# Patient Record
Sex: MALE | Race: WHITE | NOT HISPANIC OR LATINO | Employment: STUDENT | URBAN - METROPOLITAN AREA
[De-identification: names, ages, dates, MRNs, and addresses within clinical notes are randomized per-mention and may not be internally consistent; named-entity substitution may affect disease eponyms.]

---
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
## 2024-10-07 ENCOUNTER — APPOINTMENT (EMERGENCY)
Dept: RADIOLOGY | Facility: HOSPITAL | Age: 19
End: 2024-10-07
Payer: COMMERCIAL

## 2024-10-07 ENCOUNTER — HOSPITAL ENCOUNTER (EMERGENCY)
Facility: HOSPITAL | Age: 19
Discharge: HOME | End: 2024-10-07
Payer: COMMERCIAL

## 2024-10-07 VITALS
RESPIRATION RATE: 16 BRPM | WEIGHT: 133.6 LBS | OXYGEN SATURATION: 99 % | TEMPERATURE: 98.5 F | HEART RATE: 93 BPM | DIASTOLIC BLOOD PRESSURE: 65 MMHG | BODY MASS INDEX: 19.73 KG/M2 | SYSTOLIC BLOOD PRESSURE: 108 MMHG

## 2024-10-07 DIAGNOSIS — R10.9 ABDOMINAL PAIN: Primary | ICD-10-CM

## 2024-10-07 LAB
ALBUMIN SERPL BCG-MCNC: 4.7 G/DL (ref 3.5–5)
ALP SERPL-CCNC: 58 U/L (ref 34–104)
ALT SERPL W P-5'-P-CCNC: 11 U/L (ref 7–52)
ANION GAP SERPL CALCULATED.3IONS-SCNC: 11 MMOL/L (ref 4–13)
AST SERPL W P-5'-P-CCNC: 18 U/L (ref 13–39)
BASOPHILS # BLD AUTO: 0.03 THOUSANDS/ΜL (ref 0–0.1)
BASOPHILS NFR BLD AUTO: 1 % (ref 0–1)
BILIRUB SERPL-MCNC: 0.42 MG/DL (ref 0.2–1)
BUN SERPL-MCNC: 15 MG/DL (ref 5–25)
CALCIUM SERPL-MCNC: 9.2 MG/DL (ref 8.4–10.2)
CHLORIDE SERPL-SCNC: 104 MMOL/L (ref 96–108)
CO2 SERPL-SCNC: 24 MMOL/L (ref 21–32)
CREAT SERPL-MCNC: 0.86 MG/DL (ref 0.6–1.3)
EOSINOPHIL # BLD AUTO: 0.13 THOUSAND/ΜL (ref 0–0.61)
EOSINOPHIL NFR BLD AUTO: 2 % (ref 0–6)
ERYTHROCYTE [DISTWIDTH] IN BLOOD BY AUTOMATED COUNT: 12.2 % (ref 11.6–15.1)
GFR SERPL CREATININE-BSD FRML MDRD: 123 ML/MIN/1.73SQ M
GLUCOSE SERPL-MCNC: 111 MG/DL (ref 65–140)
HCT VFR BLD AUTO: 47.3 % (ref 36.5–49.3)
HGB BLD-MCNC: 16 G/DL (ref 12–17)
IMM GRANULOCYTES # BLD AUTO: 0.01 THOUSAND/UL (ref 0–0.2)
IMM GRANULOCYTES NFR BLD AUTO: 0 % (ref 0–2)
LIPASE SERPL-CCNC: 22 U/L (ref 11–82)
LYMPHOCYTES # BLD AUTO: 2.04 THOUSANDS/ΜL (ref 0.6–4.47)
LYMPHOCYTES NFR BLD AUTO: 35 % (ref 14–44)
MCH RBC QN AUTO: 30 PG (ref 26.8–34.3)
MCHC RBC AUTO-ENTMCNC: 33.8 G/DL (ref 31.4–37.4)
MCV RBC AUTO: 89 FL (ref 82–98)
MONOCYTES # BLD AUTO: 0.4 THOUSAND/ΜL (ref 0.17–1.22)
MONOCYTES NFR BLD AUTO: 7 % (ref 4–12)
NEUTROPHILS # BLD AUTO: 3.28 THOUSANDS/ΜL (ref 1.85–7.62)
NEUTS SEG NFR BLD AUTO: 55 % (ref 43–75)
NRBC BLD AUTO-RTO: 0 /100 WBCS
PLATELET # BLD AUTO: 171 THOUSANDS/UL (ref 149–390)
PMV BLD AUTO: 10.5 FL (ref 8.9–12.7)
POTASSIUM SERPL-SCNC: 3.6 MMOL/L (ref 3.5–5.3)
PROT SERPL-MCNC: 6.8 G/DL (ref 6.4–8.4)
RBC # BLD AUTO: 5.33 MILLION/UL (ref 3.88–5.62)
SODIUM SERPL-SCNC: 139 MMOL/L (ref 135–147)
WBC # BLD AUTO: 5.89 THOUSAND/UL (ref 4.31–10.16)

## 2024-10-07 PROCEDURE — 74177 CT ABD & PELVIS W/CONTRAST: CPT

## 2024-10-07 PROCEDURE — 36415 COLL VENOUS BLD VENIPUNCTURE: CPT

## 2024-10-07 PROCEDURE — 96366 THER/PROPH/DIAG IV INF ADDON: CPT

## 2024-10-07 PROCEDURE — 99284 EMERGENCY DEPT VISIT MOD MDM: CPT

## 2024-10-07 PROCEDURE — 99285 EMERGENCY DEPT VISIT HI MDM: CPT

## 2024-10-07 PROCEDURE — 96375 TX/PRO/DX INJ NEW DRUG ADDON: CPT

## 2024-10-07 PROCEDURE — 96365 THER/PROPH/DIAG IV INF INIT: CPT

## 2024-10-07 PROCEDURE — 83690 ASSAY OF LIPASE: CPT

## 2024-10-07 PROCEDURE — 85025 COMPLETE CBC W/AUTO DIFF WBC: CPT

## 2024-10-07 PROCEDURE — 80053 COMPREHEN METABOLIC PANEL: CPT

## 2024-10-07 RX ORDER — DROPERIDOL 2.5 MG/ML
0.62 INJECTION, SOLUTION INTRAMUSCULAR; INTRAVENOUS ONCE
Status: COMPLETED | OUTPATIENT
Start: 2024-10-07 | End: 2024-10-07

## 2024-10-07 RX ORDER — SODIUM CHLORIDE, SODIUM GLUCONATE, SODIUM ACETATE, POTASSIUM CHLORIDE, MAGNESIUM CHLORIDE, SODIUM PHOSPHATE, DIBASIC, AND POTASSIUM PHOSPHATE .53; .5; .37; .037; .03; .012; .00082 G/100ML; G/100ML; G/100ML; G/100ML; G/100ML; G/100ML; G/100ML
1000 INJECTION, SOLUTION INTRAVENOUS ONCE
Status: COMPLETED | OUTPATIENT
Start: 2024-10-07 | End: 2024-10-07

## 2024-10-07 RX ADMIN — IOHEXOL 85 ML: 350 INJECTION, SOLUTION INTRAVENOUS at 06:04

## 2024-10-07 RX ADMIN — DROPERIDOL 0.62 MG: 2.5 INJECTION, SOLUTION INTRAMUSCULAR; INTRAVENOUS at 05:55

## 2024-10-07 RX ADMIN — SODIUM CHLORIDE, SODIUM GLUCONATE, SODIUM ACETATE, POTASSIUM CHLORIDE, MAGNESIUM CHLORIDE, SODIUM PHOSPHATE, DIBASIC, AND POTASSIUM PHOSPHATE 1000 ML: .53; .5; .37; .037; .03; .012; .00082 INJECTION, SOLUTION INTRAVENOUS at 05:14

## 2024-10-07 NOTE — ED ATTENDING ATTESTATION
I, Herb Cruz DO, saw and evaluated the patient. All available labs and X-rays were reviewed. I discussed the patient with the resident / non-physician and agree with the resident's / non-physician practitioner's findings and plan as documented in the resident's / non-physician practicitioner's note, except where noted. At this point, I agree with the current assessment done in the ED.     NAME: Abiel Venegas  AGE: 19 y.o. SEX: male  : 2005   MRN: [id number]  ENCOUNTER: [id number]    Disposition   Active Problems:  There are no active Hospital Problems.      ED Disposition       ED Disposition   Discharge    Condition   Stable    Date/Time   Mon Oct 7, 2024  6:52 AM    Comment   Abiel Venegas discharge to home/self care.                      Assessment/Plan   Medical Decision Making  Patient with history as below presented with abdominal pain. History obtained from patient.    Differential diagnosis includes: Appendicitis, cholecystitis, obstruction    Plan: CBC, CMP, lipase, CT abdomen pelvis, droperidol, fluids    Labs reviewed and unremarkable. Independently reviewed imaging without acute emergent pathology. Patient was treated with below with improvement in symptoms. Reassessed the patient and they continue to be well appearing. Presentation undifferentiated however does not appear emergent nature. Stable for outpatient management.    Disposition: Discharged with instructions to obtain outpatient follow up of patient's symptoms and findings, with strict return precautions if patient develops new or worsening symptoms. Patient understands this plan and is agreeable. All questions answered. Patient discharged home with return precautions.    Amount and/or Complexity of Data Reviewed  Labs: ordered.  Radiology: ordered.    Risk  Prescription drug management.                        History of Present Illness     Patient is a 19 y.o. male with a significant past medical history of anxiety,  bipolar disorder, presenting for evaluation of abdominal pain.  Patient reports that he has chronic abdominal pain, however he is unsure of his diagnosis as [place].  He reports that today he had some abdominal pain that is similar to previous.  It is a sharp, lower abdominal pain.  There is no radiation.  He associates some nonbloody, nonmelanotic diarrhea.  There is no associated urinary symptoms, fevers, nausea, vomiting.  He is otherwise without complaint.    Past Medical History     Past Medical History:   Diagnosis Date    Anxiety     Asperger syndrome     Bipolar 1 disorder (HCC)     Depression        Past Surgical History     Past Surgical History:   Procedure Laterality Date    TONSILLECTOMY         Social History     Social History     Substance and Sexual Activity   Alcohol Use No     Social History     Substance and Sexual Activity   Drug Use Yes    Types: Marijuana     Social History     Tobacco Use   Smoking Status Every Day    Current packs/day: 1.50    Types: Cigarettes   Smokeless Tobacco Current   Tobacco Comments    Tammy       Family History     Family History   Problem Relation Age of Onset    No Known Problems Mother     No Known Problems Father     Thyroid disease unspecified Neg Hx        Medications Prior to Admission     Prior to Admission medications    Medication Sig Start Date End Date Taking? Authorizing Provider   cloNIDine (CATAPRES) 0.1 mg tablet Take 0.1 mg by mouth every 12 (twelve) hours 1 tab in the morning and 1/2 tab at night.    Historical Provider, MD   Desvenlafaxine Succinate ER (PRISTIQ) 25 MG TB24 Take 25 mg by mouth every morning    Historical Provider, MD   lithium carbonate (LITHOBID) 450 mg CR tablet Take 450 mg by mouth 2 (two) times a day 450mg in the morning and 600mg at night.     Historical Provider, MD   LORazepam (ATIVAN) 1 mg tablet Take 1 mg by mouth as needed      Historical Provider, MD   QUEtiapine (SEROquel XR) 200  mg 24 hr tablet Take 200 mg by mouth daily    Historical Provider, MD   QUEtiapine (SEROquel) 100 mg tablet Take 100 mg by mouth daily at bedtime      Historical Provider, MD       Allergies     Allergies   Allergen Reactions    Sulfa Antibiotics Hives    Penicillins        Objective     Vitals:    10/07/24 0444 10/07/24 0451 10/07/24 0545 10/07/24 0600   BP: (!) 152/112  110/58 108/65   Pulse: 89  65 93   Resp: 18  16 16   Temp:  98.5 °F (36.9 °C)     SpO2: 99%  98% 99%   Weight: 60.6 kg (133 lb 9.6 oz)        Body mass index is 19.73 kg/m².  No intake or output data in the 24 hours ending 10/07/24 0705  Invasive Devices       Peripheral Intravenous Line  Duration             Peripheral IV 10/07/24 Right Antecubital <1 day                    Review of Systems   Constitutional:  Negative for fever.   Respiratory:  Negative for shortness of breath.    Cardiovascular:  Negative for chest pain.   Gastrointestinal:  Positive for abdominal pain and diarrhea. Negative for blood in stool, nausea and vomiting.   Genitourinary:  Negative for dysuria.        Physical Exam  Vitals and nursing note reviewed.   Constitutional:       General: He is not in acute distress.     Appearance: Normal appearance. He is not ill-appearing or toxic-appearing.   HENT:      Head: Normocephalic and atraumatic.      Right Ear: External ear normal.      Left Ear: External ear normal.      Nose: Nose normal.   Eyes:      General: No scleral icterus.        Right eye: No discharge.         Left eye: No discharge.      Extraocular Movements: Extraocular movements intact.      Conjunctiva/sclera: Conjunctivae normal.   Cardiovascular:      Rate and Rhythm: Normal rate.      Heart sounds: Normal heart sounds. No murmur heard.     No friction rub. No gallop.   Pulmonary:      Effort: Pulmonary effort is normal. No respiratory distress.      Breath sounds: Normal breath sounds.   Abdominal:      General: Abdomen is flat. There is no distension.       Palpations: Abdomen is soft. There is no mass.      Tenderness: There is abdominal tenderness in the suprapubic area.   Genitourinary:     Comments: Deferred  Skin:     General: Skin is warm and dry.   Neurological:      General: No focal deficit present.      Mental Status: He is alert.          Medications   multi-electrolyte (ISOLYTE-S PH 7.4) bolus 1,000 mL (1,000 mL Intravenous New Bag 10/7/24 0514)   droperidol (INAPSINE) injection 0.625 mg (0.625 mg Intravenous Given 10/7/24 0555)   iohexol (OMNIPAQUE) 350 MG/ML injection (MULTI-DOSE) 85 mL (85 mL Intravenous Given 10/7/24 0604)        Results Reviewed       Procedure Component Value Units Date/Time    Comprehensive metabolic panel [[id number]] Collected: 10/07/24 0513    Lab Status: Final result Specimen: Blood from Arm, Right Updated: 10/07/24 0544     Sodium 139 mmol/L      Potassium 3.6 mmol/L      Chloride 104 mmol/L      CO2 24 mmol/L      ANION GAP 11 mmol/L      BUN 15 mg/dL      Creatinine 0.86 mg/dL      Glucose 111 mg/dL      Calcium 9.2 mg/dL      AST 18 U/L      ALT 11 U/L      Alkaline Phosphatase 58 U/L      Total Protein 6.8 g/dL      Albumin 4.7 g/dL      Total Bilirubin 0.42 mg/dL      eGFR 123 ml/min/1.73sq m     Narrative:      [employer] guidelines for Chronic Kidney Disease (CKD):     Stage 1 with normal or high GFR (GFR > 90 mL/min/1.73 square meters)    Stage 2 Mild CKD (GFR = 60-89 mL/min/1.73 square meters)    Stage 3A Moderate CKD (GFR = 45-59 mL/min/1.73 square meters)    Stage 3B Moderate CKD (GFR = 30-44 mL/min/1.73 square meters)    Stage 4 Severe CKD (GFR = 15-29 mL/min/1.73 square meters)    Stage 5 End Stage CKD (GFR <15 mL/min/1.73 square meters)  Note: GFR calculation is accurate only with a steady state creatinine    Lipase [[id number]]  (Normal) Collected: 10/07/24 0513    Lab Status: Final result Specimen: Blood from Arm, Right Updated: 10/07/24 0544     Lipase 22 u/L     CBC and differential  [[id number]] Collected: 10/07/24 0513    Lab Status: Final result Specimen: Blood from Arm, Right Updated: 10/07/24 0529     WBC 5.89 Thousand/uL      RBC 5.33 Million/uL      Hemoglobin 16.0 g/dL      Hematocrit 47.3 %      MCV 89 fL      MCH 30.0 pg      MCHC 33.8 g/dL      RDW 12.2 %      MPV 10.5 fL      Platelets 171 Thousands/uL      nRBC 0 /100 WBCs      Segmented % 55 %      Immature Grans % 0 %      Lymphocytes % 35 %      Monocytes % 7 %      Eosinophils Relative 2 %      Basophils Relative 1 %      Absolute Neutrophils 3.28 Thousands/µL      Absolute Immature Grans 0.01 Thousand/uL      Absolute Lymphocytes 2.04 Thousands/µL      Absolute Monocytes 0.40 Thousand/µL      Eosinophils Absolute 0.13 Thousand/µL      Basophils Absolute 0.03 Thousands/µL              CT abdomen pelvis with contrast   Final Result by Amanda Alfaro MD (10/07 0631)      No acute findings. No findings identified to explain pain.            Workstation performed: [id number]              ED Course         Procedures   Procedures

## 2024-10-07 NOTE — ED PROVIDER NOTES
Final diagnoses:   Abdominal pain     ED Disposition       ED Disposition   Discharge    Condition   Stable    Date/Time   Mon Oct 7, 2024  6:52 AM    Comment   Abiel Venegas discharge to home/self care.                   Assessment & Plan       Medical Decision Making  Amount and/or Complexity of Data Reviewed  Labs: ordered. Decision-making details documented in ED Course.  Radiology: ordered. Decision-making details documented in ED Course.    Risk  Prescription drug management.        ED Course as of 10/07/24 0826   Mon Oct 07, 2024   0500 Patient seen and evaluated by me  DDx: Flareup of patient's underlying abdominal pain for which the underlying source is unknown.  Given lower abdominal pain, concern for acute appendicitis.  Side effect secondary to marijuana use, electrolyte abnormality, anemia, pancreatitis, biliary disease.  Workup and plan: CBC, CMP, lipase, CT abdomen and pelvis.  Fluids, droperidol.   0620 CBC and differential  WNL   0620 Comprehensive metabolic panel  WNL   0620 LIPASE: 22   0648 CT abdomen pelvis with contrast  IMPRESSION:     No acute findings. No findings identified to explain pain.     0652 Patient reports resolution of symptoms.  He is discharged at this time, abdomen benign.  Patient given return precautions and follow-up information.  He reports understanding, all questions answered.       Medications   multi-electrolyte (ISOLYTE-S PH 7.4) bolus 1,000 mL (0 mL Intravenous Stopped 10/7/24 0655)   droperidol (INAPSINE) injection 0.625 mg (0.625 mg Intravenous Given 10/7/24 0555)   iohexol (OMNIPAQUE) 350 MG/ML injection (MULTI-DOSE) 85 mL (85 mL Intravenous Given 10/7/24 0604)       ED Risk Strat Scores                           SBIRT 20yo+      Flowsheet Row Most Recent Value   Initial Alcohol Screen: US AUDIT-C     1. How often do you have a drink containing alcohol? 0 Filed at: 10/07/2024 0446   2. How many drinks containing alcohol do you have on a typical day you are  drinking?  0 Filed at: 10/07/2024 0446   3a. Male UNDER 65: How often do you have five or more drinks on one occasion? 0 Filed at: 10/07/2024 0446   3b. FEMALE Any Age, or MALE 65+: How often do you have 4 or more drinks on one occassion? 0 Filed at: 10/07/2024 0446   Audit-C Score 0 Filed at: 10/07/2024 0446   BAILEE: How many times in the past year have you...    Used an illegal drug or used a prescription medication for non-medical reasons? Never Filed at: 10/07/2024 0446                            History of Present Illness       Chief Complaint   Patient presents with    Abdominal Pain     Pt walking back to apartment at [place] and c/o abdominal pain. Pt states it is a chronic problem. Denies NVD.        Past Medical History:   Diagnosis Date    Anxiety     Asperger syndrome     Bipolar 1 disorder (HCC)     Depression       Past Surgical History:   Procedure Laterality Date    TONSILLECTOMY        Family History   Problem Relation Age of Onset    No Known Problems Mother     No Known Problems Father     Thyroid disease unspecified Neg Hx       Social History     Tobacco Use    Smoking status: Every Day     Current packs/day: 1.50     Types: Cigarettes    Smokeless tobacco: Current    Tobacco comments:     Tammy   Substance Use Topics    Alcohol use: No    Drug use: Yes     Types: Marijuana      E-Cigarette/Vaping      E-Cigarette/Vaping Substances      I have reviewed and agree with the history as documented.     Patient is a 19-year-old male, no significant past medical history presenting for the evaluation of abdominal pain.  Patient states that he does have abdominal pain frequently which flares up from time to time.  He is unsure of prior diagnoses made for the abdominal pain.  Patient states tonight's episode feels similar to prior episodes.  He [facility] he had sudden onset sharp periumbilical pain.  He states that it was very sharp and severe for 2 to 3 minutes  before gradually subsiding.  At this point, patient still does have abdominal pain, but describes it more as an achy pain rather than severe and sharp as it was earlier.  Patient does report associated nausea but no vomiting.  He states that when the pain came on sharply today, he felt lightheaded as if he was going to pass out.  He did not lose consciousness at any time.  He reports associated diarrhea over the past few days.  He states that normally when his pain does flareup he does get diarrhea as well.  He reports blood in his diarrhea intermittently, describes it as a liquid stool.  Patient denies any fevers or sick contacts.  He denies any recent travel outside of the country or antibiotic use.  He denies any specific suspicious food intake.  Patient has not been around anybody else with similar symptoms.  No urinary symptoms.  No abdominal surgeries.  Patient does use marijuana daily as well as vapes daily.  He reports occasional alcohol use, but did not drink at all tonight.          Review of Systems   Constitutional:  Negative for chills, fatigue and fever.   HENT:  Negative for congestion.    Respiratory:  Negative for cough, chest tightness and shortness of breath.    Cardiovascular:  Negative for chest pain.   Gastrointestinal:  Positive for abdominal pain, diarrhea and nausea. Negative for vomiting.   Genitourinary:  Negative for difficulty urinating, dysuria, frequency and hematuria.   Skin:  Negative for color change.   Neurological:  Positive for light-headedness. Negative for dizziness, syncope, weakness, numbness and headaches.           Objective       ED Triage Vitals   Temperature Pulse Blood Pressure Respirations SpO2 Patient Position - Orthostatic VS   10/07/24 0451 10/07/24 0444 10/07/24 0444 10/07/24 0444 10/07/24 0444 --   98.5 °F (36.9 °C) 89 (!) 152/112 18 99 %       Temp src Heart Rate Source BP Location FiO2 (%) Pain Score    -- -- -- -- --              Vitals      Date and Time Temp  Pulse SpO2 Resp BP Pain Score FACES Pain Rating User   10/07/24 0600 -- 93 99 % 16 108/65 -- -- [facility]   10/07/24 0545 -- 65 98 % 16 110/58 -- -- [facility]   10/07/24 0451 98.5 °F (36.9 °C) -- -- -- -- -- -- [facility]   10/07/24 0444 -- 89 99 % 18 152/112 -- -- [facility]            Physical Exam  Vitals and nursing note reviewed.   Constitutional:       General: He is not in acute distress.     Appearance: Normal appearance. He is not ill-appearing or toxic-appearing.   HENT:      Head: Normocephalic and atraumatic.      Nose: No congestion.   Eyes:      General: No scleral icterus.     Extraocular Movements: Extraocular movements intact.   Cardiovascular:      Rate and Rhythm: Normal rate and regular rhythm.      Pulses: Normal pulses.      Heart sounds: Normal heart sounds. No murmur heard.  Pulmonary:      Effort: Pulmonary effort is normal. No respiratory distress.      Breath sounds: Normal breath sounds. No wheezing or rhonchi.   Abdominal:      General: Abdomen is flat. There is no distension.      Palpations: Abdomen is soft.      Tenderness: There is abdominal tenderness in the periumbilical area. There is guarding (Voluntary).   Musculoskeletal:         General: Normal range of motion.      Cervical back: Normal range of motion.   Skin:     General: Skin is warm and dry.      Capillary Refill: Capillary refill takes less than 2 seconds.   Neurological:      General: No focal deficit present.      Mental Status: He is alert.      Cranial Nerves: No cranial nerve deficit.      Sensory: No sensory deficit.      Motor: No weakness.      Gait: Gait normal.   Psychiatric:         Mood and Affect: Mood normal.         Behavior: Behavior normal.         Results Reviewed       Procedure Component Value Units Date/Time    Comprehensive metabolic panel [[id number]] Collected: 10/07/24 0513    Lab Status: Final result Specimen: Blood from Arm, Right Updated: 10/07/24 0544     Sodium 139 mmol/L      Potassium 3.6 mmol/L      Chloride 104  mmol/L      CO2 24 mmol/L      ANION GAP 11 mmol/L      BUN 15 mg/dL      Creatinine 0.86 mg/dL      Glucose 111 mg/dL      Calcium 9.2 mg/dL      AST 18 U/L      ALT 11 U/L      Alkaline Phosphatase 58 U/L      Total Protein 6.8 g/dL      Albumin 4.7 g/dL      Total Bilirubin 0.42 mg/dL      eGFR 123 ml/min/1.73sq m     Narrative:      National Kidney Disease Foundation guidelines for Chronic Kidney Disease (CKD):     Stage 1 with normal or high GFR (GFR > 90 mL/min/1.73 square meters)    Stage 2 Mild CKD (GFR = 60-89 mL/min/1.73 square meters)    Stage 3A Moderate CKD (GFR = 45-59 mL/min/1.73 square meters)    Stage 3B Moderate CKD (GFR = 30-44 mL/min/1.73 square meters)    Stage 4 Severe CKD (GFR = 15-29 mL/min/1.73 square meters)    Stage 5 End Stage CKD (GFR <15 mL/min/1.73 square meters)  Note: GFR calculation is accurate only with a steady state creatinine    Lipase [[id number]]  (Normal) Collected: 10/07/24 0513    Lab Status: Final result Specimen: Blood from Arm, Right Updated: 10/07/24 0544     Lipase 22 u/L     CBC and differential [[id number]] Collected: 10/07/24 0513    Lab Status: Final result Specimen: Blood from Arm, Right Updated: 10/07/24 0529     WBC 5.89 Thousand/uL      RBC 5.33 Million/uL      Hemoglobin 16.0 g/dL      Hematocrit 47.3 %      MCV 89 fL      MCH 30.0 pg      MCHC 33.8 g/dL      RDW 12.2 %      MPV 10.5 fL      Platelets 171 Thousands/uL      nRBC 0 /100 WBCs      Segmented % 55 %      Immature Grans % 0 %      Lymphocytes % 35 %      Monocytes % 7 %      Eosinophils Relative 2 %      Basophils Relative 1 %      Absolute Neutrophils 3.28 Thousands/µL      Absolute Immature Grans 0.01 Thousand/uL      Absolute Lymphocytes 2.04 Thousands/µL      Absolute Monocytes 0.40 Thousand/µL      Eosinophils Absolute 0.13 Thousand/µL      Basophils Absolute 0.03 Thousands/µL             CT abdomen pelvis with contrast   Final Interpretation by Amanda Alfaro MD (10/07 0631)      No acute  findings. No findings identified to explain pain.            Workstation performed: [id number]             Procedures    ED Medication and Procedure Management   Prior to Admission Medications   Prescriptions Last Dose Informant Patient Reported? Taking?   Desvenlafaxine Succinate ER (PRISTIQ) 25 MG TB24   Yes No   Sig: Take 25 mg by mouth every morning   LORazepam (ATIVAN) 1 mg tablet   Yes No   Sig: Take 1 mg by mouth as needed     QUEtiapine (SEROquel XR) 200 mg 24 hr tablet  Father Yes No   Sig: Take 200 mg by mouth daily   QUEtiapine (SEROquel) 100 mg tablet   Yes No   Sig: Take 100 mg by mouth daily at bedtime     cloNIDine (CATAPRES) 0.1 mg tablet   Yes No   Sig: Take 0.1 mg by mouth every 12 (twelve) hours 1 tab in the morning and 1/2 tab at night.   lithium carbonate (LITHOBID) 450 mg CR tablet   Yes No   Sig: Take 450 mg by mouth 2 (two) times a day 450mg in the morning and 600mg at night.       Facility-Administered Medications: None     Discharge Medication List as of 10/7/2024  6:52 AM        CONTINUE these medications which have NOT CHANGED    Details   cloNIDine (CATAPRES) 0.1 mg tablet Take 0.1 mg by mouth every 12 (twelve) hours 1 tab in the morning and 1/2 tab at night., Historical Med      Desvenlafaxine Succinate ER (PRISTIQ) 25 MG TB24 Take 25 mg by mouth every morning, Historical Med      lithium carbonate (LITHOBID) 450 mg CR tablet Take 450 mg by mouth 2 (two) times a day 450mg in the morning and 600mg at night. , Historical Med      LORazepam (ATIVAN) 1 mg tablet Take 1 mg by mouth as needed  , Historical Med      QUEtiapine (SEROquel XR) 200 mg 24 hr tablet Take 200 mg by mouth daily, Historical Med      QUEtiapine (SEROquel) 100 mg tablet Take 100 mg by mouth daily at bedtime  , Historical Med           No discharge procedures on file.  ED SEPSIS DOCUMENTATION   Time reflects when diagnosis was documented in both MDM as applicable and the Disposition within this note       Time User Action  Codes Description Comment    10/7/2024  6:52 AM Sonali Dangelo Add [R10.9] Abdominal pain                  Sonali Dangelo MD  10/07/24 0826

## 2024-12-01 ENCOUNTER — APPOINTMENT (OUTPATIENT)
Dept: RADIOLOGY | Facility: CLINIC | Age: 19
End: 2024-12-01
Payer: COMMERCIAL

## 2024-12-01 ENCOUNTER — OFFICE VISIT (OUTPATIENT)
Dept: URGENT CARE | Facility: CLINIC | Age: 19
End: 2024-12-01
Payer: COMMERCIAL

## 2024-12-01 VITALS
SYSTOLIC BLOOD PRESSURE: 132 MMHG | OXYGEN SATURATION: 97 % | DIASTOLIC BLOOD PRESSURE: 74 MMHG | HEART RATE: 108 BPM | RESPIRATION RATE: 18 BRPM

## 2024-12-01 DIAGNOSIS — S69.91XA HAND INJURY, RIGHT, INITIAL ENCOUNTER: Primary | ICD-10-CM

## 2024-12-01 DIAGNOSIS — S69.91XA HAND INJURY, RIGHT, INITIAL ENCOUNTER: ICD-10-CM

## 2024-12-01 PROCEDURE — 73130 X-RAY EXAM OF HAND: CPT

## 2024-12-01 PROCEDURE — G0382 LEV 3 HOSP TYPE B ED VISIT: HCPCS | Performed by: NURSE PRACTITIONER

## 2024-12-01 PROCEDURE — S9083 URGENT CARE CENTER GLOBAL: HCPCS | Performed by: NURSE PRACTITIONER

## 2024-12-01 RX ORDER — PREDNISONE 20 MG/1
20 TABLET ORAL 2 TIMES DAILY WITH MEALS
Qty: 10 TABLET | Refills: 0 | Status: SHIPPED | OUTPATIENT
Start: 2024-12-01 | End: 2024-12-06

## 2024-12-01 NOTE — PROGRESS NOTES
[facility]        NAME: Abiel Venegas is a 19 y.o. male  : 2005    MRN: [id number]  DATE: 2024  TIME: 3:13 PM    Assessment and Plan   Hand injury, right, initial encounter [S69.91XA]  1. Hand injury, right, initial encounter  XR hand 3+ vw right            Patient Instructions       Normal x-ray of the hand  Referral to ortho hand specialist  Take prednisone as prescribed  Follow up with PCP in 3-5 days.  Proceed to  ER if symptoms worsen.    If tests have been performed at [facility], our office will contact you with results if changes need to be made to the care plan discussed with you at the visit.  [facility]    Chief Complaint     Chief Complaint   Patient presents with    Thumb Pain     Patient has right thumb pain sustained about a week ago after he accidentally hyperextended his thumb when picking up boxes          History of Present Illness       HPI  Pain of the right thumb for about 1 week. Started while he was lifting a box, and he accidentally hyperextended the right thumb. Has been ongoing. Worse with grasping. Better at rest. No radiation of the pain. Says the pain has been getting worse since onset.       Review of Systems   Review of Systems   Constitutional:  Negative for fever.   Musculoskeletal:  Positive for arthralgias (right hand). Negative for joint swelling.   Skin:  Negative for color change, rash and wound.   Neurological:  Negative for numbness.         Current Medications       Current Outpatient Medications:     cloNIDine (CATAPRES) 0.1 mg tablet, Take 0.1 mg by mouth every 12 (twelve) hours 1 tab in the morning and 1/2 tab at night., Disp: , Rfl:     Desvenlafaxine Succinate ER (PRISTIQ) 25 MG TB24, Take 25 mg by mouth every morning, Disp: , Rfl:     lithium carbonate (LITHOBID) 450 mg CR tablet, Take 450 mg by mouth 2 (two) times a day 450mg in the morning and 600mg at night. , Disp: , Rfl:     LORazepam  Pt awake BRP wth assist, request anxiolytic, reviewed with ED provider, Ativan 1 mg ordered, given     Armando Griffiths, YUDY  08/17/20 8604 (ATIVAN) 1 mg tablet, Take 1 mg by mouth as needed  , Disp: , Rfl:     QUEtiapine (SEROquel XR) 200 mg 24 hr tablet, Take 200 mg by mouth daily, Disp: , Rfl:     QUEtiapine (SEROquel) 100 mg tablet, Take 100 mg by mouth daily at bedtime  , Disp: , Rfl:     Current Allergies     Allergies as of 12/01/2024 - Reviewed 12/01/2024   Allergen Reaction Noted    Sulfa antibiotics Hives 09/21/2018    Penicillins  06/09/2017            The following portions of the patient's history were reviewed and updated as appropriate: allergies, current medications, past family history, past medical history, past social history, past surgical history and problem list.     Past Medical History:   Diagnosis Date    Anxiety     Asperger syndrome     Bipolar 1 disorder (HCC)     Depression        Past Surgical History:   Procedure Laterality Date    TONSILLECTOMY         Family History   Problem Relation Age of Onset    No Known Problems Mother     No Known Problems Father     Thyroid disease unspecified Neg Hx          Medications have been verified.        Objective   /74   Pulse (!) 108   Resp 18   SpO2 97%   No LMP for male patient.       Physical Exam     Physical Exam  Constitutional:       General: He is not in acute distress.  Musculoskeletal:         General: Swelling and tenderness (with palpation of the first metacarap bone of the right hand) present. No deformity.      Comments:  strength is normal, but with tenderness along the right metacarpal   Skin:     Capillary Refill: Capillary refill takes less than 2 seconds.      Findings: No bruising or erythema.

## 2025-02-04 ENCOUNTER — OFFICE VISIT (OUTPATIENT)
Dept: FAMILY MEDICINE CLINIC | Facility: CLINIC | Age: 20
End: 2025-02-04
Payer: COMMERCIAL

## 2025-02-04 VITALS
OXYGEN SATURATION: 98 % | HEART RATE: 101 BPM | TEMPERATURE: 98 F | HEIGHT: 69 IN | WEIGHT: 128.6 LBS | RESPIRATION RATE: 16 BRPM | SYSTOLIC BLOOD PRESSURE: 130 MMHG | BODY MASS INDEX: 19.05 KG/M2 | DIASTOLIC BLOOD PRESSURE: 90 MMHG

## 2025-02-04 DIAGNOSIS — Z00.00 HEALTH MAINTENANCE EXAMINATION: Primary | ICD-10-CM

## 2025-02-04 PROBLEM — F31.62 BIPOLAR DISORDER, CURRENT EPISODE MIXED, MODERATE (HCC): Status: RESOLVED | Noted: 2017-11-29 | Resolved: 2025-02-04

## 2025-02-04 PROBLEM — F12.90 MARIJUANA USE, CONTINUOUS: Status: ACTIVE | Noted: 2025-02-04

## 2025-02-04 PROBLEM — R94.6 ABNORMAL THYROID FUNCTION TEST: Status: RESOLVED | Noted: 2018-06-21 | Resolved: 2025-02-04

## 2025-02-04 PROBLEM — Z00.8 ENCOUNTER FOR PSYCHOLOGICAL EVALUATION: Status: RESOLVED | Noted: 2018-09-21 | Resolved: 2025-02-04

## 2025-02-04 PROBLEM — F19.10 SUBSTANCE ABUSE, CONTINUOUS (HCC): Status: RESOLVED | Noted: 2018-09-21 | Resolved: 2025-02-04

## 2025-02-04 PROCEDURE — 99385 PREV VISIT NEW AGE 18-39: CPT | Performed by: FAMILY MEDICINE
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
## 2025-02-04 NOTE — PATIENT INSTRUCTIONS
Sounds like he had a chaotic time in his teenage years and has graduated from that.  Pretty normal at this point.  Exam is unremarkable except for a large comedone on his forehead.  Suggest combination of 2 Advil and 2 extra strength Tylenol for any back or leg pain.  Previous diagnoses are removed from his problem list as our previous medications.  Follow-up 1 year or as needed.

## 2025-04-09 ENCOUNTER — HOSPITAL ENCOUNTER (EMERGENCY)
Facility: HOSPITAL | Age: 20
Discharge: HOME | End: 2025-04-09
Attending: EMERGENCY MEDICINE
Payer: COMMERCIAL

## 2025-04-09 ENCOUNTER — APPOINTMENT (EMERGENCY)
Dept: RADIOLOGY | Facility: HOSPITAL | Age: 20
End: 2025-04-09
Payer: COMMERCIAL

## 2025-04-09 VITALS
HEART RATE: 91 BPM | SYSTOLIC BLOOD PRESSURE: 142 MMHG | DIASTOLIC BLOOD PRESSURE: 96 MMHG | TEMPERATURE: 96.9 F | RESPIRATION RATE: 18 BRPM | OXYGEN SATURATION: 96 %

## 2025-04-09 DIAGNOSIS — R05.9 COUGH: Primary | ICD-10-CM

## 2025-04-09 PROCEDURE — 71046 X-RAY EXAM CHEST 2 VIEWS: CPT

## 2025-04-09 PROCEDURE — 94640 AIRWAY INHALATION TREATMENT: CPT

## 2025-04-09 PROCEDURE — 99284 EMERGENCY DEPT VISIT MOD MDM: CPT | Performed by: EMERGENCY MEDICINE

## 2025-04-09 PROCEDURE — 99283 EMERGENCY DEPT VISIT LOW MDM: CPT

## 2025-04-09 RX ORDER — IPRATROPIUM BROMIDE AND ALBUTEROL SULFATE 2.5; .5 MG/3ML; MG/3ML
3 SOLUTION RESPIRATORY (INHALATION) ONCE
Status: COMPLETED | OUTPATIENT
Start: 2025-04-09 | End: 2025-04-09

## 2025-04-09 RX ORDER — GUAIFENESIN/DEXTROMETHORPHAN 100-10MG/5
10 SYRUP ORAL ONCE
Status: COMPLETED | OUTPATIENT
Start: 2025-04-09 | End: 2025-04-09

## 2025-04-09 RX ORDER — ALBUTEROL SULFATE 90 UG/1
2 INHALANT RESPIRATORY (INHALATION) ONCE
Status: COMPLETED | OUTPATIENT
Start: 2025-04-09 | End: 2025-04-09

## 2025-04-09 RX ADMIN — GUAIFENESIN AND DEXTROMETHORPHAN 10 ML: 100; 10 SYRUP ORAL at 06:47

## 2025-04-09 RX ADMIN — IPRATROPIUM BROMIDE AND ALBUTEROL SULFATE 3 ML: .5; 3 SOLUTION RESPIRATORY (INHALATION) at 06:47

## 2025-04-09 RX ADMIN — ALBUTEROL SULFATE 2 PUFF: 90 AEROSOL, METERED RESPIRATORY (INHALATION) at 07:39

## 2025-04-09 NOTE — ED ATTENDING ATTESTATION
4/9/2025  I, Mahi Milan DO, saw and evaluated the patient. I have discussed the patient with the resident/non-physician practitioner and agree with the resident's/non-physician practitioner's findings, Plan of Care, and MDM as documented in the resident's/non-physician practitioner's note, except where noted. All available labs and Radiology studies were reviewed.  I was present for key portions of any procedure(s) performed by the resident/non-physician practitioner and I was immediately available to provide assistance.       At this point I agree with the current assessment done in the Emergency Department.  I have conducted an independent evaluation of this patient a history and physical is as follows:    Chief Complaint   Patient presents with    Cough     Pt has had a cough for the past week, unable to sleep or stop coughing     18yo male presents with cough for about a week.  No fevers, no sob.  Friend had walking pneumonia.  On exam-no acute distress, appears nontoxic, heart regular, lungs clear.  Plan-will give breathing treatment, chest x-ray.  I examined patient after breathing treatment and his lungs are clear and he states he is feeling much better    ED Course         Critical Care Time  Procedures

## 2025-04-09 NOTE — DISCHARGE INSTRUCTIONS
You have been seen in the emergency department for evaluation of a cough.  Your workup today was unrevealing.  Please continue with albuterol as needed for cough.  Please return to the emergency department should she develop shortness of breath or if you develop new worrisome symptoms.    Please follow-up with your primary care doctor.

## 2025-04-10 NOTE — ED PROVIDER NOTES
Time reflects when diagnosis was documented in both MDM as applicable and the Disposition within this note       Time User Action Codes Description Comment    4/9/2025  7:34 AM Sonali Dangelo Add [R05.9] Cough           ED Disposition       ED Disposition   Discharge    Condition   Stable    Date/Time   Wed Apr 9, 2025  7:34 AM    Comment   Abiel cherylsocorro discharge to home/self care.                   Assessment & Plan       Medical Decision Making  Amount and/or Complexity of Data Reviewed  Radiology: ordered and independent interpretation performed. Decision-making details documented in ED Course.    Risk  OTC drugs.  Prescription drug management.        ED Course as of 04/10/25 0624   Wed Apr 09, 2025   0635 Patient seen and evaluated by me  DDx: Viral illness, allergies, asthma exacerbation secondary to the aforementioned.  Doubt pneumonia, effusion, doubt pneumothorax.  No signs or symptoms concerning for PTA, RPA.  Workup and plan: Two-view chest x-ray, DuoNeb, Robitussin   0730 XR chest 2 views  Normal on my interpretation   0737 Patient reports improvement of symptoms after albuterol and is appropriate and amenable for discharge at this time.  Given return precautions and follow-up information.  He reports understanding, all questions answered.  Discharge patient with inhaler with spacer.       Medications   ipratropium-albuterol (DUO-NEB) 0.5-2.5 mg/3 mL inhalation solution 3 mL (3 mL Nebulization Given 4/9/25 0647)   dextromethorphan-guaiFENesin (ROBITUSSIN DM) oral syrup 10 mL (10 mL Oral Given 4/9/25 0647)   albuterol (PROVENTIL HFA,VENTOLIN HFA) inhaler 2 puff (2 puffs Inhalation Given 4/9/25 0739)       ED Risk Strat Scores                    No data recorded                            History of Present Illness       Chief Complaint   Patient presents with    Cough     Pt has had a cough for the past week, unable to sleep or stop coughing       Past Medical History:   Diagnosis Date    Anxiety      Asperger syndrome     Bipolar 1 disorder (HCC)     Depression       Past Surgical History:   Procedure Laterality Date    TONSILLECTOMY        Family History   Problem Relation Age of Onset    No Known Problems Mother     No Known Problems Father     Thyroid disease unspecified Neg Hx       Social History     Tobacco Use    Smoking status: Every Day     Current packs/day: 1.50     Types: Cigarettes    Smokeless tobacco: Current    Tobacco comments:     Tammy   Vaping Use    Vaping status: Never Used   Substance Use Topics    Alcohol use: No    Drug use: Yes     Types: Marijuana      E-Cigarette/Vaping    E-Cigarette Use Never User       E-Cigarette/Vaping Substances    Nicotine No     THC No     CBD No     Flavoring No     Other No     Unknown No       I have reviewed and agree with the history as documented.     Patient is a 19-year-old male, past medical history significant for seasonal asthma, presenting today for evaluation of a cough.  Patient states that he has been coughing over the past 2 to 3 days and it has been keeping him up at night.  Patient reports significant irritation secondary to cough and lack of sleep.  He does occasionally feel short of breath with the cough, but does not feel short of breath in between coughing episodes.  He has been trying to take Delsym, cough drops, Tylenol, Motrin, with no relief.  He does have a history of asthma and has been trying to use his albuterol inhaler which has not changed symptoms.  He has had no fevers or chills.  He has concern for pneumonia due to smoking with someone recently who has pneumonia.  He reports coughing up a clear to white sputum.  No GI symptoms.  No throat swelling or difficulty swallowing, voice changes.          Review of Systems   Constitutional:  Negative for chills, fatigue and fever.   HENT:  Negative for congestion.    Respiratory:  Positive for cough and shortness of breath. Negative for chest tightness.    Cardiovascular:   Negative for chest pain.   Gastrointestinal:  Negative for abdominal pain, diarrhea, nausea and vomiting.   Genitourinary:  Negative for difficulty urinating.   Skin:  Negative for color change.   Neurological:  Negative for dizziness, syncope, weakness, numbness and headaches.           Objective       ED Triage Vitals [04/09/25 0600]   Temperature Pulse Blood Pressure Respirations SpO2 Patient Position - Orthostatic VS   (!) 96.9 °F (36.1 °C) 91 142/96 18 96 % Sitting      Temp Source Heart Rate Source BP Location FiO2 (%) Pain Score    Temporal Monitor Right arm -- --      Vitals      Date and Time Temp Pulse SpO2 Resp BP Pain Score FACES Pain Rating User   04/09/25 0600 96.9 °F (36.1 °C) 91 96 % 18 142/96 -- -- PT            Physical Exam  Vitals and nursing note reviewed.   Constitutional:       General: He is not in acute distress.     Appearance: Normal appearance. He is not ill-appearing or toxic-appearing.   HENT:      Head: Normocephalic and atraumatic.      Mouth/Throat:      Mouth: Mucous membranes are moist.      Pharynx: No oropharyngeal exudate or posterior oropharyngeal erythema.   Eyes:      General: No scleral icterus.     Extraocular Movements: Extraocular movements intact.   Cardiovascular:      Rate and Rhythm: Normal rate and regular rhythm.      Pulses: Normal pulses.      Heart sounds: Normal heart sounds. No murmur heard.  Pulmonary:      Effort: Pulmonary effort is normal. No respiratory distress.      Breath sounds: Wheezing (focal, mild end expiratory wheezes in the bilateral bases) present. No rhonchi.   Abdominal:      General: Abdomen is flat. There is no distension.      Palpations: Abdomen is soft.      Tenderness: There is no abdominal tenderness.   Musculoskeletal:         General: Normal range of motion.      Cervical back: Normal range of motion.   Skin:     General: Skin is warm.      Capillary Refill: Capillary refill takes less than 2 seconds.   Neurological:      General: No  focal deficit present.      Mental Status: He is alert.      Cranial Nerves: No cranial nerve deficit.      Sensory: No sensory deficit.      Motor: No weakness.      Gait: Gait normal.   Psychiatric:         Mood and Affect: Mood normal.         Behavior: Behavior normal.         Results Reviewed       None            XR chest 2 views   ED Interpretation by Sonali Dangelo MD (04/09 0730)   No acute cardiopulmonary disease on my interpretation      Final Interpretation by Cory Davis MD (04/09 0911)      No focal consolidation, pleural effusion, or pneumothorax.            Resident: Alcides River I, the attending radiologist, have reviewed the images and agree with the final report above.      Workstation performed: [id number]             Procedures    ED Medication and Procedure Management   None     There are no discharge medications for this patient.    No discharge procedures on file.  ED SEPSIS DOCUMENTATION   Time reflects when diagnosis was documented in both MDM as applicable and the Disposition within this note       Time User Action Codes Description Comment    4/9/2025  7:34 AM Sonali Dangelo Add [R05.9] Cough                  Sonali Dangelo MD  04/10/25 0624